# Patient Record
Sex: FEMALE | Race: OTHER | NOT HISPANIC OR LATINO | URBAN - METROPOLITAN AREA
[De-identification: names, ages, dates, MRNs, and addresses within clinical notes are randomized per-mention and may not be internally consistent; named-entity substitution may affect disease eponyms.]

---

## 2017-07-07 ENCOUNTER — EMERGENCY (EMERGENCY)
Facility: HOSPITAL | Age: 22
LOS: 1 days | Discharge: HOME | End: 2017-07-07
Attending: EMERGENCY MEDICINE
Payer: SELF-PAY

## 2017-07-07 VITALS
OXYGEN SATURATION: 98 % | SYSTOLIC BLOOD PRESSURE: 106 MMHG | HEART RATE: 88 BPM | DIASTOLIC BLOOD PRESSURE: 68 MMHG | RESPIRATION RATE: 18 BRPM | HEIGHT: 62 IN | WEIGHT: 136.03 LBS | TEMPERATURE: 99 F

## 2017-07-07 DIAGNOSIS — R55 SYNCOPE AND COLLAPSE: ICD-10-CM

## 2017-07-07 DIAGNOSIS — K29.70 GASTRITIS, UNSPECIFIED, WITHOUT BLEEDING: ICD-10-CM

## 2017-07-07 PROCEDURE — 99053 MED SERV 10PM-8AM 24 HR FAC: CPT

## 2017-07-07 PROCEDURE — 99285 EMERGENCY DEPT VISIT HI MDM: CPT | Mod: 25

## 2017-07-08 LAB
ALBUMIN SERPL ELPH-MCNC: 3.2 G/DL — LOW (ref 3.5–5)
ALP SERPL-CCNC: 60 U/L — SIGNIFICANT CHANGE UP (ref 40–120)
ALT FLD-CCNC: 9 U/L DA — LOW (ref 10–60)
ANION GAP SERPL CALC-SCNC: 6 MMOL/L — SIGNIFICANT CHANGE UP (ref 5–17)
APPEARANCE UR: ABNORMAL
AST SERPL-CCNC: 17 U/L — SIGNIFICANT CHANGE UP (ref 10–40)
BASOPHILS # BLD AUTO: 0.1 K/UL — SIGNIFICANT CHANGE UP (ref 0–0.2)
BASOPHILS NFR BLD AUTO: 1 % — SIGNIFICANT CHANGE UP (ref 0–2)
BILIRUB SERPL-MCNC: 0.3 MG/DL — SIGNIFICANT CHANGE UP (ref 0.2–1.2)
BILIRUB UR-MCNC: NEGATIVE — SIGNIFICANT CHANGE UP
BUN SERPL-MCNC: 9 MG/DL — SIGNIFICANT CHANGE UP (ref 7–18)
CALCIUM SERPL-MCNC: 8.3 MG/DL — LOW (ref 8.4–10.5)
CHLORIDE SERPL-SCNC: 108 MMOL/L — SIGNIFICANT CHANGE UP (ref 96–108)
CO2 SERPL-SCNC: 28 MMOL/L — SIGNIFICANT CHANGE UP (ref 22–31)
COLOR SPEC: YELLOW — SIGNIFICANT CHANGE UP
CREAT SERPL-MCNC: 0.66 MG/DL — SIGNIFICANT CHANGE UP (ref 0.5–1.3)
DIFF PNL FLD: NEGATIVE — SIGNIFICANT CHANGE UP
EOSINOPHIL # BLD AUTO: 0.2 K/UL — SIGNIFICANT CHANGE UP (ref 0–0.5)
EOSINOPHIL NFR BLD AUTO: 2.5 % — SIGNIFICANT CHANGE UP (ref 0–6)
GLUCOSE SERPL-MCNC: 77 MG/DL — SIGNIFICANT CHANGE UP (ref 70–99)
GLUCOSE UR QL: NEGATIVE — SIGNIFICANT CHANGE UP
HCG UR QL: NEGATIVE — SIGNIFICANT CHANGE UP
HCT VFR BLD CALC: 35.6 % — SIGNIFICANT CHANGE UP (ref 34.5–45)
HGB BLD-MCNC: 11 G/DL — LOW (ref 11.5–15.5)
KETONES UR-MCNC: NEGATIVE — SIGNIFICANT CHANGE UP
LEUKOCYTE ESTERASE UR-ACNC: ABNORMAL
LIDOCAIN IGE QN: 149 U/L — SIGNIFICANT CHANGE UP (ref 73–393)
LYMPHOCYTES # BLD AUTO: 3.4 K/UL — HIGH (ref 1–3.3)
LYMPHOCYTES # BLD AUTO: 36.9 % — SIGNIFICANT CHANGE UP (ref 13–44)
MCHC RBC-ENTMCNC: 25.8 PG — LOW (ref 27–34)
MCHC RBC-ENTMCNC: 30.8 GM/DL — LOW (ref 32–36)
MCV RBC AUTO: 83.9 FL — SIGNIFICANT CHANGE UP (ref 80–100)
MONOCYTES # BLD AUTO: 0.5 K/UL — SIGNIFICANT CHANGE UP (ref 0–0.9)
MONOCYTES NFR BLD AUTO: 5.7 % — SIGNIFICANT CHANGE UP (ref 2–14)
NEUTROPHILS # BLD AUTO: 4.9 K/UL — SIGNIFICANT CHANGE UP (ref 1.8–7.4)
NEUTROPHILS NFR BLD AUTO: 54 % — SIGNIFICANT CHANGE UP (ref 43–77)
NITRITE UR-MCNC: NEGATIVE — SIGNIFICANT CHANGE UP
PH UR: 7 — SIGNIFICANT CHANGE UP (ref 5–8)
PLATELET # BLD AUTO: 310 K/UL — SIGNIFICANT CHANGE UP (ref 150–400)
POTASSIUM SERPL-MCNC: 4 MMOL/L — SIGNIFICANT CHANGE UP (ref 3.5–5.3)
POTASSIUM SERPL-SCNC: 4 MMOL/L — SIGNIFICANT CHANGE UP (ref 3.5–5.3)
PROT SERPL-MCNC: 7.4 G/DL — SIGNIFICANT CHANGE UP (ref 6–8.3)
PROT UR-MCNC: NEGATIVE — SIGNIFICANT CHANGE UP
RBC # BLD: 4.24 M/UL — SIGNIFICANT CHANGE UP (ref 3.8–5.2)
RBC # FLD: 14.6 % — HIGH (ref 10.3–14.5)
SODIUM SERPL-SCNC: 142 MMOL/L — SIGNIFICANT CHANGE UP (ref 135–145)
SP GR SPEC: 1.01 — SIGNIFICANT CHANGE UP (ref 1.01–1.02)
UROBILINOGEN FLD QL: 1
WBC # BLD: 9.1 K/UL — SIGNIFICANT CHANGE UP (ref 3.8–10.5)
WBC # FLD AUTO: 9.1 K/UL — SIGNIFICANT CHANGE UP (ref 3.8–10.5)

## 2017-07-08 PROCEDURE — 83690 ASSAY OF LIPASE: CPT

## 2017-07-08 PROCEDURE — 36415 COLL VENOUS BLD VENIPUNCTURE: CPT

## 2017-07-08 PROCEDURE — 81001 URINALYSIS AUTO W/SCOPE: CPT

## 2017-07-08 PROCEDURE — 80053 COMPREHEN METABOLIC PANEL: CPT

## 2017-07-08 PROCEDURE — 96374 THER/PROPH/DIAG INJ IV PUSH: CPT

## 2017-07-08 PROCEDURE — 99284 EMERGENCY DEPT VISIT MOD MDM: CPT | Mod: 25

## 2017-07-08 PROCEDURE — 96375 TX/PRO/DX INJ NEW DRUG ADDON: CPT

## 2017-07-08 PROCEDURE — 85027 COMPLETE CBC AUTOMATED: CPT

## 2017-07-08 PROCEDURE — 81025 URINE PREGNANCY TEST: CPT

## 2017-07-08 RX ORDER — FAMOTIDINE 10 MG/ML
20 INJECTION INTRAVENOUS ONCE
Qty: 0 | Refills: 0 | Status: COMPLETED | OUTPATIENT
Start: 2017-07-08 | End: 2017-07-08

## 2017-07-08 RX ORDER — ONDANSETRON 8 MG/1
4 TABLET, FILM COATED ORAL ONCE
Qty: 0 | Refills: 0 | Status: COMPLETED | OUTPATIENT
Start: 2017-07-08 | End: 2017-07-08

## 2017-07-08 RX ORDER — SODIUM CHLORIDE 9 MG/ML
1000 INJECTION INTRAMUSCULAR; INTRAVENOUS; SUBCUTANEOUS ONCE
Qty: 0 | Refills: 0 | Status: COMPLETED | OUTPATIENT
Start: 2017-07-08 | End: 2017-07-08

## 2017-07-08 RX ADMIN — ONDANSETRON 4 MILLIGRAM(S): 8 TABLET, FILM COATED ORAL at 00:57

## 2017-07-08 RX ADMIN — SODIUM CHLORIDE 1000 MILLILITER(S): 9 INJECTION INTRAMUSCULAR; INTRAVENOUS; SUBCUTANEOUS at 00:56

## 2017-07-08 RX ADMIN — FAMOTIDINE 20 MILLIGRAM(S): 10 INJECTION INTRAVENOUS at 00:57

## 2017-07-08 NOTE — ED PROVIDER NOTE - OBJECTIVE STATEMENT
23 y/o F pt with no significant PMHx presents to ED c/o nausea, vomiting (10 episodes yesterday, 2 episodes today), and fainting (2 episodes) since returning from [facility] x yesterday. Pt notes 3 episodes of vomiting with streaks of blood. Pt has been able to tolerate PO since vomiting episodes. Pt denies fever, chills, shortness of breath, cough, chest pain, palpitations, dysuria, urinary frequency, hematuria, or any other complaints. NKDA.

## 2017-07-08 NOTE — ED PROVIDER NOTE - MEDICAL DECISION MAKING DETAILS
23 y/o F pt with nausea, vomiting and syncope s/p recent foreign travel. Plan for labs, Zofran, IVF, and reassess.

## 2017-07-08 NOTE — ED PROVIDER NOTE - PROGRESS NOTE DETAILS
pt reassessed, no longer with nausea.  Tolerating PO.  Will d/c.  Pt has PMD to follow up with.  pt given copy of all results.

## 2017-07-08 NOTE — ED PROVIDER NOTE - CHPI ED SYMPTOMS NEG
no fever/no shortness of breath, no cough, no chest pain, no palpitations, no dysuria, no urinary frequency, no hematuria/no chills

## 2017-09-10 ENCOUNTER — EMERGENCY (EMERGENCY)
Facility: HOSPITAL | Age: 22
LOS: 1 days | Discharge: ANOTHER HOSPITAL | End: 2017-09-10
Attending: EMERGENCY MEDICINE
Payer: MEDICAID

## 2017-09-10 VITALS
SYSTOLIC BLOOD PRESSURE: 108 MMHG | TEMPERATURE: 99 F | WEIGHT: 136.03 LBS | HEART RATE: 115 BPM | DIASTOLIC BLOOD PRESSURE: 74 MMHG | OXYGEN SATURATION: 100 % | RESPIRATION RATE: 16 BRPM | HEIGHT: 64 IN

## 2017-09-10 VITALS
HEART RATE: 95 BPM | SYSTOLIC BLOOD PRESSURE: 104 MMHG | RESPIRATION RATE: 16 BRPM | TEMPERATURE: 99 F | DIASTOLIC BLOOD PRESSURE: 59 MMHG | OXYGEN SATURATION: 97 %

## 2017-09-10 DIAGNOSIS — F33.3 MAJOR DEPRESSIVE DISORDER, RECURRENT, SEVERE WITH PSYCHOTIC SYMPTOMS: ICD-10-CM

## 2017-09-10 LAB
ALBUMIN SERPL ELPH-MCNC: 3.6 G/DL — SIGNIFICANT CHANGE UP (ref 3.5–5)
ALP SERPL-CCNC: 71 U/L — SIGNIFICANT CHANGE UP (ref 40–120)
ALT FLD-CCNC: 8 U/L DA — LOW (ref 10–60)
ANION GAP SERPL CALC-SCNC: 5 MMOL/L — SIGNIFICANT CHANGE UP (ref 5–17)
APAP SERPL-MCNC: <2 UG/ML — LOW (ref 10–30)
APPEARANCE UR: CLEAR — SIGNIFICANT CHANGE UP
AST SERPL-CCNC: 11 U/L — SIGNIFICANT CHANGE UP (ref 10–40)
BASOPHILS # BLD AUTO: 0.1 K/UL — SIGNIFICANT CHANGE UP (ref 0–0.2)
BASOPHILS NFR BLD AUTO: 1 % — SIGNIFICANT CHANGE UP (ref 0–2)
BILIRUB SERPL-MCNC: 0.2 MG/DL — SIGNIFICANT CHANGE UP (ref 0.2–1.2)
BILIRUB UR-MCNC: NEGATIVE — SIGNIFICANT CHANGE UP
BUN SERPL-MCNC: 12 MG/DL — SIGNIFICANT CHANGE UP (ref 7–18)
CALCIUM SERPL-MCNC: 8.6 MG/DL — SIGNIFICANT CHANGE UP (ref 8.4–10.5)
CHLORIDE SERPL-SCNC: 109 MMOL/L — HIGH (ref 96–108)
CO2 SERPL-SCNC: 28 MMOL/L — SIGNIFICANT CHANGE UP (ref 22–31)
COLOR SPEC: YELLOW — SIGNIFICANT CHANGE UP
CREAT SERPL-MCNC: 0.91 MG/DL — SIGNIFICANT CHANGE UP (ref 0.5–1.3)
DIFF PNL FLD: NEGATIVE — SIGNIFICANT CHANGE UP
EOSINOPHIL # BLD AUTO: 0.2 K/UL — SIGNIFICANT CHANGE UP (ref 0–0.5)
EOSINOPHIL NFR BLD AUTO: 2.7 % — SIGNIFICANT CHANGE UP (ref 0–6)
ETHANOL SERPL-MCNC: <3 MG/DL — SIGNIFICANT CHANGE UP (ref 0–10)
GLUCOSE SERPL-MCNC: 92 MG/DL — SIGNIFICANT CHANGE UP (ref 70–99)
GLUCOSE UR QL: NEGATIVE — SIGNIFICANT CHANGE UP
HCG UR QL: NEGATIVE — SIGNIFICANT CHANGE UP
HCT VFR BLD CALC: 37.2 % — SIGNIFICANT CHANGE UP (ref 34.5–45)
HGB BLD-MCNC: 11.6 G/DL — SIGNIFICANT CHANGE UP (ref 11.5–15.5)
KETONES UR-MCNC: NEGATIVE — SIGNIFICANT CHANGE UP
LEUKOCYTE ESTERASE UR-ACNC: ABNORMAL
LYMPHOCYTES # BLD AUTO: 2.7 K/UL — SIGNIFICANT CHANGE UP (ref 1–3.3)
LYMPHOCYTES # BLD AUTO: 35.1 % — SIGNIFICANT CHANGE UP (ref 13–44)
MCHC RBC-ENTMCNC: 26.5 PG — LOW (ref 27–34)
MCHC RBC-ENTMCNC: 31.3 GM/DL — LOW (ref 32–36)
MCV RBC AUTO: 84.7 FL — SIGNIFICANT CHANGE UP (ref 80–100)
MONOCYTES # BLD AUTO: 0.7 K/UL — SIGNIFICANT CHANGE UP (ref 0–0.9)
MONOCYTES NFR BLD AUTO: 8.5 % — SIGNIFICANT CHANGE UP (ref 2–14)
NEUTROPHILS # BLD AUTO: 4 K/UL — SIGNIFICANT CHANGE UP (ref 1.8–7.4)
NEUTROPHILS NFR BLD AUTO: 52.6 % — SIGNIFICANT CHANGE UP (ref 43–77)
NITRITE UR-MCNC: NEGATIVE — SIGNIFICANT CHANGE UP
PCP SPEC-MCNC: SIGNIFICANT CHANGE UP
PH UR: 7 — SIGNIFICANT CHANGE UP (ref 5–8)
PLATELET # BLD AUTO: 288 K/UL — SIGNIFICANT CHANGE UP (ref 150–400)
POTASSIUM SERPL-MCNC: 3.9 MMOL/L — SIGNIFICANT CHANGE UP (ref 3.5–5.3)
POTASSIUM SERPL-SCNC: 3.9 MMOL/L — SIGNIFICANT CHANGE UP (ref 3.5–5.3)
PROT SERPL-MCNC: 7.6 G/DL — SIGNIFICANT CHANGE UP (ref 6–8.3)
PROT UR-MCNC: NEGATIVE — SIGNIFICANT CHANGE UP
RBC # BLD: 4.4 M/UL — SIGNIFICANT CHANGE UP (ref 3.8–5.2)
RBC # FLD: 13.8 % — SIGNIFICANT CHANGE UP (ref 10.3–14.5)
SALICYLATES SERPL-MCNC: <1.7 MG/DL — LOW (ref 2.8–20)
SODIUM SERPL-SCNC: 142 MMOL/L — SIGNIFICANT CHANGE UP (ref 135–145)
SP GR SPEC: 1.01 — SIGNIFICANT CHANGE UP (ref 1.01–1.02)
UROBILINOGEN FLD QL: 4
WBC # BLD: 7.7 K/UL — SIGNIFICANT CHANGE UP (ref 3.8–10.5)
WBC # FLD AUTO: 7.7 K/UL — SIGNIFICANT CHANGE UP (ref 3.8–10.5)

## 2017-09-10 PROCEDURE — 71010: CPT | Mod: 26

## 2017-09-10 PROCEDURE — 72125 CT NECK SPINE W/O DYE: CPT | Mod: 26

## 2017-09-10 PROCEDURE — 99285 EMERGENCY DEPT VISIT HI MDM: CPT

## 2017-09-10 PROCEDURE — 70450 CT HEAD/BRAIN W/O DYE: CPT | Mod: 26

## 2017-09-10 NOTE — ED PROVIDER NOTE - MUSCULOSKELETAL, MLM
Diffuse tenderness to cervical spine without point tenderness. No spinal tenderness to the rest of the spine. Moving all 4 extremities with good ROM and no joint pain.

## 2017-09-10 NOTE — ED BEHAVIORAL HEALTH ASSESSMENT NOTE - RISK ASSESSMENT
the pt's current ingestion of large amount of klonopin for a SA, current CAH, past suicide attempts, h/o non-compliance with tx, put her at acutely elevated risk of self harm. the pt requires inpatient hospitalization for stabilization. the pt's current ingestion of large amount of klonopin for a SA, current CAH, past suicide attempts, h/o non-compliance with tx, family h/o completed suicide, isolation and unemployment put her at acutely elevated risk of self harm. the pt requires inpatient hospitalization for stabilization.

## 2017-09-10 NOTE — ED ADULT TRIAGE NOTE - ARRIVAL INFO ADDITIONAL COMMENTS
Roam alert device, yellow gown applied, fall and elopement risk initiated, 1:1 continuous nursing observation with PCA Juju at bedside

## 2017-09-10 NOTE — ED BEHAVIORAL HEALTH ASSESSMENT NOTE - PSYCHIATRIC ISSUES AND PLAN (INCLUDE STANDING AND PRN MEDICATION)
seroquel 25mg PO qHS for sleep. inpatient team to determine standing medications. haldol 5mg IM and ativan 2mg IM for acute agitation
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
## 2017-09-10 NOTE — ED ADULT NURSE NOTE - OBJECTIVE STATEMENT
Patient ingested 20mg of Clonazepam, fell and hit her head, complain of chest pain, placed on 1:1 constant observation for safety. Patient ingested about 20 pills of Klonopin 1mg, fell and hit her head, complain of chest pain, placed on 1:1 constant observation for safety.

## 2017-09-10 NOTE — ED BEHAVIORAL HEALTH ASSESSMENT NOTE - NS ED BHA PLAN REASON FOR OVERRIDING OBJECTION FT
pt agrees to admission, however then stated that she wanted to go home for job interview. 2PC needed at this time

## 2017-09-10 NOTE — ED PROVIDER NOTE - PROGRESS NOTE DETAILS
Page out to toxicology Discussed with toxicology. Awaiting labs. Requests that we watch pt for 6 hours after ingestion before pt is able to be transferred to psychiatry. Will corroborate with boyfriend who witnessed, to get exact timeline. As per boyfriend, pills were ingested at 1600 today. 2PC masood salmeron, psychiatry looking for bed availability. still pending bed availability - seen by nina boles. signed out to dr monterroso - pt to be transferred for psychiatric admission lengthy discussion w pt and boyfriend (Donta). attempted to verbally de-escalate patient agitation - does not want to stay - pt is ripping off band and attempting to take IV out. Pt given ativan/haldol for agitation. Bahman narayan calledelvi again at bedside. lengthy discussion w pt and boyfriend (Donta). attempted to verbally de-escalate patient agitation - does not want to stay - pt is ripping off band and attempting to take IV out. Pt given ativan/haldol for agitation.  Threat to self/ staff. Bahman narayan calledelvi again at bedside. lengthy discussion w pt and boyfriend (Donta). Sara thinks that voices she is hearing are telopathy. Advised him that even if pt is telopathic and not having psychosis the suicide attempts are still concerning. He understands.   attempted to verbally de-escalate patient agitation - does not want to stay - pt is ripping off band and attempting to take IV out. Pt given ativan/haldol for agitation.  Threat to self/ staff. Bahman narayan calledelvi again at bedside. ADDENDUM by Madhavi Gonzales MD: I received sign-off on this patient from Dr. Volodymyr Swenson.  This is a 22yoF assessed by Dr. Swenson to be at risk for suicide and with poor insight, was awaiting transfer to inpatient psychiatry, and had received haldol/lorazepam due to agitation. Shortly after sign-off the patient continued to display agitated behavior, yelling at staff and security, and demanding to be discharged. Her male significant other was making the same demands and in light of the display of agitation on both of their parts there was concern for staff safety as well as noticeable disruption to ED flow and patient care. The transport team had arrived to transfer her. I discussed with both patient and accompanying significant other the reasons for her disposition, which had been discussed with them both multiple times before, and that at this point I was not able to repeat the assessment and change the disposition performed by my colleague. I discussed the options, including calm disposition versus further sedation in order to accomplish an orderly and safe transport. The patient continued to raise her voice though her  left the ED to make a phone call and the patient was eventually calmed enough to return to her bed. I provided her with a copy of her transfer form. When staff returned to her room, she once again became agitated and yelling. The transfer team was concerned as well for its safety as well as the patient's safety during transport. In the light of the patient's continued escalation despite attempts to explain the plan of care, and the apparent ineffectiveness of the previous medications given the patient, the patient was given IM 50mg diphenhydramine and 2mg midazolam. Transfer orders were changed as well to 4-point restraints, once again for transport team and patient safety. Transport was then initiated without further incident.

## 2017-09-10 NOTE — ED ADULT TRIAGE NOTE - CHIEF COMPLAINT QUOTE
I intentionally ingest 20  Klonopin tablets.  I want to kill myself.  I felt weak and fell forward hit my forehead on the ground, I have chest pain and palpitations

## 2017-09-10 NOTE — ED BEHAVIORAL HEALTH ASSESSMENT NOTE - DESCRIPTION
denies parents  and estranged from father since young age. mother couldn't raise and pt was raised by aunt. pt reported finished university and then came [place] the [place] from [facility] 1 year ago. was supposed to live with relatives, however there was a falling out and pt was living in a shelter. pt now living with BF. fired from [employer] recently and unemployed pt has been clam and cooperative.

## 2017-09-10 NOTE — ED ADULT NURSE NOTE - NSSISCREENINGSIGNS_ED_A_ED
command hallucinations to hurt self/insomnia/past suicide attempts/ family/history of suicide/seeking lethal means

## 2017-09-10 NOTE — ED BEHAVIORAL HEALTH ASSESSMENT NOTE - OTHER PAST PSYCHIATRIC HISTORY (INCLUDE DETAILS REGARDING ONSET, COURSE OF ILLNESS, INPATIENT/OUTPATIENT TREATMENT)
The pt sees psychiatrist, Dr. Uday Schaeffer  past suicide attempts  past psychiatric hospitalizations The pt sees psychiatrist, Dr. Uday Schaeffer for the past 2 months   past suicide attempts via overdose and taking rat poison. states that she had multiple ER visits in [facility] but parents "never believed me." had 1 overnight stay in [facility] for  in June. no past psychiatric hospitalizations

## 2017-09-10 NOTE — ED PROVIDER NOTE - MEDICAL DECISION MAKING DETAILS
23 y/o F pt presents to ED s/p overdose of Klonopin with Rexulti. Pt will require admission to psychiatry whether voluntary or involuntary. 21 y/o F pt presents to ED s/p overdose of Klonopin with Rexulti. Pt will require admission to psychiatry whether voluntary or involuntary. Pt initially agreed to voluntary then changed mind - pt is clear threat to self and has no insight into illness. 21 y/o F pt presents to ED s/p overdose of Klonopin with Rexulti. Pt will require admission to psychiatry whether voluntary or involuntary. Only protective factor is boyfriend - pt lacks insight into illness, unclear if compliant with meds, other risk factors significant. Consideration that patient may not have taking klonapin 20 tabs as she would have been more drowsy - did become more awake over time however. Constant observation upon arrival, consider organic source - none found on imaging or labs thus far. Pt initially agreed to voluntary then changed mind - pt is clear threat to self and has no insight into illness.

## 2017-09-10 NOTE — ED BEHAVIORAL HEALTH ASSESSMENT NOTE - DIFFERENTIAL
MDD with psychosis  strong suspicion for schizoaffective d/o however more information is needed   r/o PTSD from prior trauma
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
## 2017-09-10 NOTE — ED PROVIDER NOTE - OBJECTIVE STATEMENT
23 y/o F pt with no significant PMHx and no significant PSHx presents to the ED for suicidal attempts x today. Pt states her family disowned her because they chose her brother over her. Pt also states that she has been hearing voices telling her to kill herself for a while; pt states that she sometimes wants to kill herself. Pt admits that she has tried to stab herself with knives in the past, but people stopped her. Pt has also tried to take poison in order to take her life in the past. Pt notes she went to a psychiatrist, who gave pt medications, but pt is not currently taking it. Pt reports to currently have a headache and minor neck pain because she fell after taking "20 something" pills of Klonopin this afternoon to kill herself. Pt also notes to have chest pain and "pain everywhere" as well as weakness. Pt states she also took a depression medication today; pt states she currently feels depressed. Pt denies LOC, HI or any other complaints. NKDA. Pt's psychiatrist is Dr. Uday Schaeffer ([address] [place] [address]). Pt is prescribed Rexulti. Pt lives with boyfriend,  Donta Alarcon ([phone]). Boyfriend states that pt shredded her social security card because she said she did not need it since she was going to die. The Klonopin was the boyfriend's; boyfriend states he had 90 1mg tablets of Klonopin and most of them were still in the bottle.

## 2017-09-10 NOTE — ED PROVIDER NOTE - PSYCHIATRIC, MLM
Pt is depressed, flat affect. States she is hearing voices telling her to kill herself. Pt is unsure if she wants to kill herself, but probably does. Pt does not appear to be responding to internal commands at this time.

## 2017-09-10 NOTE — ED BEHAVIORAL HEALTH ASSESSMENT NOTE - DETAILS
boyfriend pt reports trying to poison her self and stab herself in the past- interrupted attempt none currently but today had CAH to harm herself will give handoff to DELILAH pt reports trying to poison her self in the past. tried to stab self yesterday but stopped by BF and OD today. currently denies SI in the ER dizzy and weak from rexuli aunt and uncle had psychosis and depression. 3 cousins have completed suicide attempts father- ETOH in childhood in [facility]. would not specify no records headache handoff to DELILAH. Dr. Kenney

## 2017-09-11 ENCOUNTER — INPATIENT (INPATIENT)
Facility: HOSPITAL | Age: 22
LOS: 7 days | Discharge: HOME | End: 2017-09-19
Attending: PSYCHIATRY & NEUROLOGY | Admitting: PSYCHIATRY & NEUROLOGY
Payer: MEDICAID

## 2017-09-11 VITALS — DIASTOLIC BLOOD PRESSURE: 77 MMHG | SYSTOLIC BLOOD PRESSURE: 117 MMHG | WEIGHT: 136.03 LBS | HEIGHT: 60 IN

## 2017-09-11 DIAGNOSIS — T14.91 SUICIDE ATTEMPT: ICD-10-CM

## 2017-09-11 PROCEDURE — 81025 URINE PREGNANCY TEST: CPT

## 2017-09-11 PROCEDURE — 81001 URINALYSIS AUTO W/SCOPE: CPT

## 2017-09-11 PROCEDURE — 99285 EMERGENCY DEPT VISIT HI MDM: CPT | Mod: 25

## 2017-09-11 PROCEDURE — 99222 1ST HOSP IP/OBS MODERATE 55: CPT | Mod: GC

## 2017-09-11 PROCEDURE — 80053 COMPREHEN METABOLIC PANEL: CPT

## 2017-09-11 PROCEDURE — 90792 PSYCH DIAG EVAL W/MED SRVCS: CPT | Mod: GT

## 2017-09-11 PROCEDURE — 96372 THER/PROPH/DIAG INJ SC/IM: CPT | Mod: XU

## 2017-09-11 PROCEDURE — 80307 DRUG TEST PRSMV CHEM ANLYZR: CPT

## 2017-09-11 PROCEDURE — 96375 TX/PRO/DX INJ NEW DRUG ADDON: CPT

## 2017-09-11 PROCEDURE — 71045 X-RAY EXAM CHEST 1 VIEW: CPT

## 2017-09-11 PROCEDURE — 93005 ELECTROCARDIOGRAM TRACING: CPT

## 2017-09-11 PROCEDURE — 85027 COMPLETE CBC AUTOMATED: CPT

## 2017-09-11 PROCEDURE — 72125 CT NECK SPINE W/O DYE: CPT

## 2017-09-11 PROCEDURE — 96374 THER/PROPH/DIAG INJ IV PUSH: CPT

## 2017-09-11 PROCEDURE — 70450 CT HEAD/BRAIN W/O DYE: CPT

## 2017-09-11 RX ORDER — MIDAZOLAM HYDROCHLORIDE 1 MG/ML
2 INJECTION, SOLUTION INTRAMUSCULAR; INTRAVENOUS ONCE
Qty: 0 | Refills: 0 | Status: DISCONTINUED | OUTPATIENT
Start: 2017-09-11 | End: 2017-09-11

## 2017-09-11 RX ORDER — CLONAZEPAM 1 MG
0.5 TABLET ORAL EVERY 8 HOURS
Qty: 0 | Refills: 0 | Status: DISCONTINUED | OUTPATIENT
Start: 2017-09-11 | End: 2017-09-11

## 2017-09-11 RX ORDER — ARIPIPRAZOLE 15 MG/1
5 TABLET ORAL AT BEDTIME
Qty: 0 | Refills: 0 | Status: DISCONTINUED | OUTPATIENT
Start: 2017-09-11 | End: 2017-09-13

## 2017-09-11 RX ORDER — DIPHENHYDRAMINE HCL 50 MG
50 CAPSULE ORAL ONCE
Qty: 0 | Refills: 0 | Status: COMPLETED | OUTPATIENT
Start: 2017-09-11 | End: 2017-09-11

## 2017-09-11 RX ORDER — CLONAZEPAM 1 MG
0.5 TABLET ORAL EVERY 8 HOURS
Qty: 0 | Refills: 0 | Status: DISCONTINUED | OUTPATIENT
Start: 2017-09-11 | End: 2017-09-13

## 2017-09-11 RX ORDER — CLONAZEPAM 1 MG
0.5 TABLET ORAL AT BEDTIME
Qty: 0 | Refills: 0 | Status: DISCONTINUED | OUTPATIENT
Start: 2017-09-11 | End: 2017-09-18

## 2017-09-11 RX ORDER — HALOPERIDOL DECANOATE 100 MG/ML
5 INJECTION INTRAMUSCULAR ONCE
Qty: 0 | Refills: 0 | Status: COMPLETED | OUTPATIENT
Start: 2017-09-11 | End: 2017-09-11

## 2017-09-11 RX ADMIN — HALOPERIDOL DECANOATE 5 MILLIGRAM(S): 100 INJECTION INTRAMUSCULAR at 01:54

## 2017-09-11 RX ADMIN — MIDAZOLAM HYDROCHLORIDE 2 MILLIGRAM(S): 1 INJECTION, SOLUTION INTRAMUSCULAR; INTRAVENOUS at 02:45

## 2017-09-11 RX ADMIN — Medication 50 MILLIGRAM(S): at 02:44

## 2017-09-11 RX ADMIN — Medication 0.5 MILLIGRAM(S): at 21:33

## 2017-09-11 RX ADMIN — Medication 2 MILLIGRAM(S): at 01:54

## 2017-09-11 RX ADMIN — ARIPIPRAZOLE 5 MILLIGRAM(S): 15 TABLET ORAL at 21:33

## 2017-09-11 NOTE — ED ADULT NURSE REASSESSMENT NOTE - NS ED NURSE REASSESS COMMENT FT1
Patient verbalize she wants to leave and she has a job interview tomorrow. Patient is concerned for her job and living situation if she goes to [employer]. Dr. Swenson seen and assessed patient, Nursing supervisor spoke with patient. Maintain 1:1 for safety and elopement risk.

## 2017-09-12 PROCEDURE — 99232 SBSQ HOSP IP/OBS MODERATE 35: CPT | Mod: GC

## 2017-09-12 RX ORDER — HALOPERIDOL DECANOATE 100 MG/ML
5 INJECTION INTRAMUSCULAR ONCE
Qty: 0 | Refills: 0 | Status: DISCONTINUED | OUTPATIENT
Start: 2017-09-12 | End: 2017-09-19

## 2017-09-12 RX ORDER — DIPHENHYDRAMINE HCL 50 MG
50 CAPSULE ORAL ONCE
Qty: 0 | Refills: 0 | Status: DISCONTINUED | OUTPATIENT
Start: 2017-09-12 | End: 2017-09-19

## 2017-09-12 RX ORDER — HALOPERIDOL DECANOATE 100 MG/ML
5 INJECTION INTRAMUSCULAR EVERY 4 HOURS
Qty: 0 | Refills: 0 | Status: DISCONTINUED | OUTPATIENT
Start: 2017-09-12 | End: 2017-09-19

## 2017-09-12 RX ADMIN — Medication 2 MILLIGRAM(S): at 20:15

## 2017-09-12 RX ADMIN — HALOPERIDOL DECANOATE 5 MILLIGRAM(S): 100 INJECTION INTRAMUSCULAR at 20:15

## 2017-09-12 RX ADMIN — Medication 0.5 MILLIGRAM(S): at 21:04

## 2017-09-12 RX ADMIN — ARIPIPRAZOLE 5 MILLIGRAM(S): 15 TABLET ORAL at 21:04

## 2017-09-13 DIAGNOSIS — T42.4X2A POISONING BY BENZODIAZEPINES, INTENTIONAL SELF-HARM, INITIAL ENCOUNTER: ICD-10-CM
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
## 2017-09-13 DIAGNOSIS — S00.83XA CONTUSION OF OTHER PART OF HEAD, INITIAL ENCOUNTER: ICD-10-CM

## 2017-09-13 DIAGNOSIS — T14.91 SUICIDE ATTEMPT: ICD-10-CM

## 2017-09-13 DIAGNOSIS — F32.9 MAJOR DEPRESSIVE DISORDER, SINGLE EPISODE, UNSPECIFIED: ICD-10-CM
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
## 2017-09-13 DIAGNOSIS — F20.9 SCHIZOPHRENIA, UNSPECIFIED: ICD-10-CM

## 2017-09-13 DIAGNOSIS — M54.2 CERVICALGIA: ICD-10-CM

## 2017-09-13 PROCEDURE — 99232 SBSQ HOSP IP/OBS MODERATE 35: CPT | Mod: GC

## 2017-09-13 RX ORDER — ARIPIPRAZOLE 15 MG/1
10 TABLET ORAL DAILY
Qty: 0 | Refills: 0 | Status: DISCONTINUED | OUTPATIENT
Start: 2017-09-13 | End: 2017-09-14

## 2017-09-13 RX ORDER — LIDOCAINE 4 G/100G
15 CREAM TOPICAL EVERY 8 HOURS
Qty: 0 | Refills: 0 | Status: DISCONTINUED | OUTPATIENT
Start: 2017-09-13 | End: 2017-09-19

## 2017-09-13 RX ADMIN — Medication 0.5 MILLIGRAM(S): at 14:19

## 2017-09-13 RX ADMIN — HALOPERIDOL DECANOATE 5 MILLIGRAM(S): 100 INJECTION INTRAMUSCULAR at 21:05

## 2017-09-13 RX ADMIN — Medication 2 MILLIGRAM(S): at 21:05

## 2017-09-13 RX ADMIN — Medication 2 MILLIGRAM(S): at 14:23

## 2017-09-13 RX ADMIN — Medication 0.5 MILLIGRAM(S): at 21:04

## 2017-09-14 PROCEDURE — 99232 SBSQ HOSP IP/OBS MODERATE 35: CPT | Mod: GC

## 2017-09-14 RX ORDER — CLONAZEPAM 1 MG
0.5 TABLET ORAL AT BEDTIME
Qty: 0 | Refills: 0 | Status: DISCONTINUED | OUTPATIENT
Start: 2017-09-19 | End: 2017-09-19

## 2017-09-14 RX ORDER — CLONAZEPAM 1 MG
0.5 TABLET ORAL EVERY 8 HOURS
Qty: 0 | Refills: 0 | Status: DISCONTINUED | OUTPATIENT
Start: 2017-09-19 | End: 2017-09-19

## 2017-09-14 RX ORDER — ARIPIPRAZOLE 15 MG/1
5 TABLET ORAL ONCE
Qty: 0 | Refills: 0 | Status: COMPLETED | OUTPATIENT
Start: 2017-09-14 | End: 2017-09-14

## 2017-09-14 RX ORDER — LANOLIN ALCOHOL/MO/W.PET/CERES
3 CREAM (GRAM) TOPICAL AT BEDTIME
Qty: 0 | Refills: 0 | Status: DISCONTINUED | OUTPATIENT
Start: 2017-09-14 | End: 2017-09-19

## 2017-09-14 RX ORDER — ARIPIPRAZOLE 15 MG/1
15 TABLET ORAL AT BEDTIME
Qty: 0 | Refills: 0 | Status: DISCONTINUED | OUTPATIENT
Start: 2017-09-15 | End: 2017-09-18

## 2017-09-14 RX ADMIN — Medication 3 MILLIGRAM(S): at 21:35

## 2017-09-14 RX ADMIN — Medication 0.5 MILLIGRAM(S): at 20:53

## 2017-09-14 RX ADMIN — ARIPIPRAZOLE 10 MILLIGRAM(S): 15 TABLET ORAL at 09:13

## 2017-09-14 RX ADMIN — ARIPIPRAZOLE 5 MILLIGRAM(S): 15 TABLET ORAL at 20:53

## 2017-09-15 PROCEDURE — 99232 SBSQ HOSP IP/OBS MODERATE 35: CPT | Mod: GC

## 2017-09-15 RX ADMIN — ARIPIPRAZOLE 15 MILLIGRAM(S): 15 TABLET ORAL at 21:12

## 2017-09-15 RX ADMIN — Medication 0.5 MILLIGRAM(S): at 21:12

## 2017-09-15 RX ADMIN — Medication 3 MILLIGRAM(S): at 21:12

## 2017-09-15 RX ADMIN — LIDOCAINE 15 MILLILITER(S): 4 CREAM TOPICAL at 09:20

## 2017-09-16 RX ADMIN — ARIPIPRAZOLE 15 MILLIGRAM(S): 15 TABLET ORAL at 21:30

## 2017-09-16 RX ADMIN — Medication 0.5 MILLIGRAM(S): at 21:30

## 2017-09-16 RX ADMIN — Medication 3 MILLIGRAM(S): at 20:51

## 2017-09-17 RX ADMIN — Medication 0.5 MILLIGRAM(S): at 20:40

## 2017-09-17 RX ADMIN — Medication 3 MILLIGRAM(S): at 21:15

## 2017-09-17 RX ADMIN — ARIPIPRAZOLE 15 MILLIGRAM(S): 15 TABLET ORAL at 20:40

## 2017-09-18 PROCEDURE — 99232 SBSQ HOSP IP/OBS MODERATE 35: CPT | Mod: GC

## 2017-09-18 RX ORDER — ARIPIPRAZOLE 15 MG/1
15 TABLET ORAL DAILY
Qty: 0 | Refills: 0 | Status: DISCONTINUED | OUTPATIENT
Start: 2017-09-18 | End: 2017-09-19

## 2017-09-18 RX ORDER — ARIPIPRAZOLE 15 MG/1
15 TABLET ORAL ONCE
Qty: 0 | Refills: 0 | Status: COMPLETED | OUTPATIENT
Start: 2017-09-18 | End: 2017-09-18

## 2017-09-18 RX ORDER — ARIPIPRAZOLE 15 MG/1
300 TABLET ORAL ONCE
Qty: 0 | Refills: 0 | Status: COMPLETED | OUTPATIENT
Start: 2017-09-19 | End: 2017-09-19

## 2017-09-18 RX ADMIN — Medication 3 MILLIGRAM(S): at 21:10

## 2017-09-18 RX ADMIN — Medication 0.5 MILLIGRAM(S): at 21:17

## 2017-09-18 RX ADMIN — ARIPIPRAZOLE 15 MILLIGRAM(S): 15 TABLET ORAL at 16:04

## 2017-09-19 VITALS — DIASTOLIC BLOOD PRESSURE: 65 MMHG | HEART RATE: 91 BPM | SYSTOLIC BLOOD PRESSURE: 103 MMHG | TEMPERATURE: 97 F

## 2017-09-19 PROCEDURE — 99238 HOSP IP/OBS DSCHRG MGMT 30/<: CPT | Mod: GC

## 2017-09-19 RX ORDER — CLONAZEPAM 1 MG
1 TABLET ORAL
Qty: 0 | Refills: 0 | COMMUNITY

## 2017-09-19 RX ORDER — MEDROXYPROGESTERONE ACETATE 150 MG/ML
0 INJECTION, SUSPENSION, EXTENDED RELEASE INTRAMUSCULAR
Qty: 0 | Refills: 0 | COMMUNITY

## 2017-09-19 RX ORDER — ARIPIPRAZOLE 15 MG/1
1 TABLET ORAL
Qty: 14 | Refills: 0 | OUTPATIENT
Start: 2017-09-19 | End: 2017-10-03

## 2017-09-19 RX ORDER — BREXPIPRAZOLE 0.25 MG/1
1.5 TABLET ORAL
Qty: 0 | Refills: 0 | COMMUNITY

## 2017-09-19 RX ADMIN — ARIPIPRAZOLE 15 MILLIGRAM(S): 15 TABLET ORAL at 08:46

## 2017-09-19 RX ADMIN — ARIPIPRAZOLE 300 MILLIGRAM(S): 15 TABLET ORAL at 09:33

## 2020-01-17 NOTE — ED BEHAVIORAL HEALTH ASSESSMENT NOTE - SUICIDAL IDEATION DETAILS
"  Sleep hygiene: Basic rules for a good night's sleep  Sleep only as much as you need to feel rested and then get out of bed   Keep a regular sleep schedule   Do not try to sleep unless you feel sleepy   Exercise regularly, preferably at least 4 to 5 hours before bedtime   Avoid caffeinated beverages after lunch   Avoid alcohol near bedtime: no "night cap"   Avoid smoking, especially in the evening   Do not go to bed hungry   Make the bedroom environment conducive to sleep   Avoid prolonged use of light-emitting screens before bedtime    Deal with your worries before bedtime     Stimulus control therapy rules  1. Go to bed only when sleepy.   2. Do not watch television, read, eat, or worry while in bed. Use bed only for sleep and sex.   3. Get out of bed if unable to fall asleep within 20 minutes and go to another room. Do not engage in activities that stimulate you; instead try reading or listening to soothing music. Return to bed only when sleepy. Repeat this step as many times as necessary throughout the night.   4. Set an alarm clock to wake up at a fixed time each morning, including weekends.   5. Do not take a nap during the day.        [email or web address]    [employer]  Shuti     " suicide attempt suicide attempt and psychosis

## 2022-05-17 NOTE — ED ADULT NURSE NOTE - ATTEMPT TO OOB
yes Detail Level: Detailed Quality 226: Preventive Care And Screening: Tobacco Use: Screening And Cessation Intervention: Patient screened for tobacco use and is an ex/non-smoker Quality 130: Documentation Of Current Medications In The Medical Record: Current Medications Documented Quality 431: Preventive Care And Screening: Unhealthy Alcohol Use - Screening: Patient not identified as an unhealthy alcohol user when screened for unhealthy alcohol use using a systematic screening method

## 2023-08-29 NOTE — ED ADULT TRIAGE NOTE - ARRIVAL FROM
Home Additional History: Patient interested in microneedling for acne skin texture, acne scars, and skin laxity on neck.

## 2025-04-08 NOTE — ED ADULT NURSE NOTE - PT NEEDS ASSIST
Refill is needed  empagliflozin (JARDIANCE) 25 MG tablet [[id number]]      [facility]/pharmacy #[id number] - [place], [place] - [address] - P [phone] - F [phone] [address] [place] [id number]  Phone: [phone]  Fax: [phone]      yes